# Patient Record
Sex: FEMALE | Race: BLACK OR AFRICAN AMERICAN | Employment: FULL TIME | URBAN - METROPOLITAN AREA
[De-identification: names, ages, dates, MRNs, and addresses within clinical notes are randomized per-mention and may not be internally consistent; named-entity substitution may affect disease eponyms.]

---

## 2021-03-31 LAB
HPV COMMENT: NORMAL
HPV TYPE 16: NOT DETECTED
HPV TYPE 18: NOT DETECTED
HPVOH (OTHER TYPES): NOT DETECTED

## 2021-08-10 ENCOUNTER — HOSPITAL ENCOUNTER (EMERGENCY)
Age: 51
Discharge: HOME | End: 2021-08-10
Attending: EMERGENCY MEDICINE
Payer: COMMERCIAL

## 2021-08-10 VITALS
RESPIRATION RATE: 16 BRPM | SYSTOLIC BLOOD PRESSURE: 140 MMHG | HEIGHT: 64 IN | DIASTOLIC BLOOD PRESSURE: 91 MMHG | TEMPERATURE: 97.2 F | HEART RATE: 67 BPM | BODY MASS INDEX: 35.68 KG/M2 | OXYGEN SATURATION: 98 % | WEIGHT: 209 LBS

## 2021-08-10 DIAGNOSIS — G89.29 CHRONIC PAIN OF LEFT WRIST: Primary | ICD-10-CM

## 2021-08-10 DIAGNOSIS — M25.532 CHRONIC PAIN OF LEFT WRIST: Primary | ICD-10-CM

## 2021-08-10 PROCEDURE — 99283 EMERGENCY DEPT VISIT LOW MDM: CPT

## 2021-08-10 RX ORDER — PROPRANOLOL HYDROCHLORIDE 80 MG/1
TABLET ORAL
COMMUNITY
Start: 2021-08-01

## 2021-08-10 RX ORDER — PANTOPRAZOLE SODIUM 40 MG/1
40 TABLET, DELAYED RELEASE ORAL
COMMUNITY
Start: 2021-05-24

## 2021-08-10 RX ORDER — ONDANSETRON 4 MG/1
4 TABLET, FILM COATED ORAL EVERY 8 HOURS PRN
COMMUNITY

## 2021-08-10 RX ORDER — FAMOTIDINE 20 MG/1
TABLET, FILM COATED ORAL
COMMUNITY
Start: 2021-05-16

## 2021-08-10 RX ORDER — METHYLPREDNISOLONE 4 MG/1
TABLET ORAL
COMMUNITY
Start: 2021-05-21

## 2021-08-10 RX ORDER — PREDNISONE 10 MG/1
TABLET ORAL
COMMUNITY
Start: 2021-06-04

## 2021-08-10 RX ORDER — POTASSIUM CHLORIDE 20 MEQ/1
TABLET, EXTENDED RELEASE ORAL
COMMUNITY
Start: 2021-05-17

## 2021-08-10 RX ORDER — MECLIZINE HYDROCHLORIDE 25 MG/1
25 TABLET ORAL 3 TIMES DAILY PRN
COMMUNITY

## 2021-08-10 RX ORDER — NORTRIPTYLINE HYDROCHLORIDE 10 MG/1
10 CAPSULE ORAL NIGHTLY
COMMUNITY

## 2021-08-10 RX ORDER — MONTELUKAST SODIUM 10 MG/1
TABLET ORAL
COMMUNITY
Start: 2021-06-14

## 2021-08-10 RX ORDER — FUROSEMIDE 20 MG/1
TABLET ORAL
COMMUNITY
Start: 2021-05-17

## 2021-08-10 RX ORDER — METHOCARBAMOL 500 MG/1
1000 TABLET, FILM COATED ORAL EVERY 8 HOURS PRN
COMMUNITY
Start: 2021-03-01

## 2021-08-10 RX ORDER — CETIRIZINE HYDROCHLORIDE 10 MG/1
TABLET ORAL
COMMUNITY
Start: 2021-07-06

## 2021-08-10 RX ORDER — BUTALBITAL, ACETAMINOPHEN AND CAFFEINE 50; 325; 40 MG/1; MG/1; MG/1
1 TABLET ORAL EVERY 4 HOURS PRN
COMMUNITY

## 2021-08-10 RX ORDER — LANSOPRAZOLE 15 MG/1
CAPSULE, DELAYED RELEASE ORAL
COMMUNITY
Start: 2021-05-13

## 2021-08-10 RX ORDER — AMLODIPINE BESYLATE 10 MG/1
10 TABLET ORAL DAILY
COMMUNITY

## 2021-08-10 RX ORDER — GABAPENTIN 100 MG/1
100 CAPSULE ORAL EVERY 8 HOURS PRN
Qty: 10 CAPSULE | Refills: 0 | Status: SHIPPED | OUTPATIENT
Start: 2021-08-10 | End: 2021-08-15

## 2021-08-10 ASSESSMENT — PAIN DESCRIPTION - PAIN TYPE: TYPE: ACUTE PAIN

## 2021-08-10 ASSESSMENT — PAIN SCALES - GENERAL: PAINLEVEL_OUTOF10: 10

## 2021-08-12 NOTE — ED PROVIDER NOTES
EMERGENCY DEPARTMENT PROVIDER NOTE    Patient Identification  Pt Name: Morenita Hernández  MRN: [id number]  Aryagframbo 1970  Date of evaluation: 8/10/2021  Provider: Yaritza Sanders DO  PCP: Peter Lyons    Chief Complaint  Arm Swelling (c/o left hand swelling. had surgery in august last year. went back to work last week and swelling has not improved. )      HPI  (History provided by patient)  This is a 54 y.o. female who was brought in by self for left hand and wrist pain. Patient reports pain ongoing for the past year after patient had tendon release for [employer]. Has had intermittent swelling to her left hand and wrist with seems to be worsened with activity, this is also been ongoing for the past year. Patient recently returned to work and is now again having swelling. Nothing seems to make her symptoms better. Pain is aching, mild in severity. She denies any direct trauma or injury. Denies any weakness or numbness.     ROS    Const:  No fevers, no chills  Skin:  No rash, no lesions  Card:  No chest pain, no palpitations, +edema  Resp:  No shortness of breath, no cough  Abd:  No abdominal pain, no nausea  MSK:  +joint pain, no myalgia  Neuro:  No focal weakness, no paresthesia    All other systems reviewed and negative unless otherwise noted in HPI      I have reviewed the following nursing documentation:  Allergies: Codeine    Past medical history:   Past Medical History:   Diagnosis Date    Osteoarthritis     both knees    Reflux      Past surgical history:   Past Surgical History:   Procedure Laterality Date    APPENDECTOMY      DILATION AND CURETTAGE OF UTERUS  8/19/11    HYSTEROSCOPY,NOVASURE ABLATION       Home medications:   Discharge Medication List as of 8/10/2021 11:06 PM      CONTINUE these medications which have NOT CHANGED    Details   propranolol (INDERAL) 80 MG tablet Historical Med      amLODIPine (NORVASC) 10 MG tablet Take 10 mg by mouth dailyHistorical Med cetirizine (ZYRTEC) 10 MG tablet TAKE 1 TABLET BY MOUTH EVERY DAYHistorical Med      butalbital-acetaminophen-caffeine (FIORICET, ESGIC) -40 MG per tablet Take 1 tablet by mouth every 4 hours as neededHistorical Med      furosemide (LASIX) 20 MG tablet TAKE 1 TABLET BY MOUTH EVERY DAYHistorical Med      famotidine (PEPCID) 20 MG tablet TAKE 1 TABLET (20 MG TOTAL) BY MOUTH 2 TIMES A DAY AS NEEDED FOR HEARTBURN FOR UP TO 5 DAYS. Historical Med      meclizine (ANTIVERT) 25 MG tablet Take 25 mg by mouth 3 times daily as neededHistorical Med      lansoprazole (PREVACID) 15 MG delayed release capsule Take by mouthHistorical Med      montelukast (SINGULAIR) 10 MG tablet TAKE 1 TABLET BY MOUTH EVERYDAY AT BEDTIMEHistorical Med      methylPREDNISolone (MEDROL DOSEPACK) 4 MG tablet TAKE 6 TABLETS ON DAY 1 AS DIRECTED ON PACKAGE AND DECREASE BY 1 TAB EACH DAY FOR A TOTAL OF 6 DAYSHistorical Med      methocarbamol (ROBAXIN) 500 MG tablet Take 1,000 mg by mouth every 8 hours as neededHistorical Med      nortriptyline (PAMELOR) 10 MG capsule Take 10 mg by mouth nightlyHistorical Med      ondansetron (ZOFRAN) 4 MG tablet Take 4 mg by mouth every 8 hours as neededHistorical Med      pantoprazole (PROTONIX) 40 MG tablet Take 40 mg by mouth 2 times daily (before meals)Historical Med      predniSONE (DELTASONE) 10 MG tablet PLEASE SEE ATTACHED FOR DETAILED DIRECTIONSHistorical Med      potassium chloride (KLOR-CON M) 20 MEQ extended release tablet Take by mouthHistorical Med      ibuprofen (ADVIL;MOTRIN) 800 MG tablet Take 800 mg by mouth every 6 hours as needed. Historical Med      omeprazole (PRILOSEC) 10 MG capsule Take 10 mg by mouth daily. Historical Med      ferrous sulfate 325 (65 FE) MG tablet Take 325 mg by mouth daily (with breakfast). Historical Med      Norethin Ace-Eth Estrad-FE (LOESTRIN 24 FE PO) Take 1 tablet by mouth daily. Historical Med             Social history:  reports that she has never smoked.  She has never used smokeless tobacco. She reports that she does not drink alcohol and does not use drugs. Family history:    Family History   Problem Relation Age of Onset    Diabetes Mother     COPD Mother     Arthritis Father     Heart Disease Father     Diabetes Father          Exam  ED Triage Vitals [08/10/21 2209]   BP Temp Temp Source Pulse Resp SpO2 Height Weight   (!) 140/91 97.2 °F (36.2 °C) Infrared 67 16 98 % 5' 4\" (1.626 m) 209 lb (94.8 kg)     Nursing note and vitals reviewed. Constitutional: Well developed, well nourished. Non-toxic in appearance. Neck: Supple. Trachea midline. Cardiovascular: RRR; no murmurs, rubs, or gallops. Radial 2+ and symmetric. Pulmonary/Chest: Effort normal. No respiratory distress. CTAB. No stridor. No wheezes. No rales. Musculoskeletal: Moves all extremities. No gross deformity. Mild tenderness palpation overlying lateral left wrist with minimal soft tissue edema along distal lateral forearm. Left wrist full range of motion in flexion and extension. There is no erythema or increased warmth. Neurological: Alert and oriented. Face symmetric. Speech is clear. 5 out of 5 motor and sensation grossly intact bilateral upper extremities. Skin: Warm and dry. No rash. Radiology  No orders to display       Labs  No results found for this visit on 08/10/21. Screenings           MDM and ED Course    Patient afebrile and nontoxic. No distress. Upper extremities are neurovascularly intact. Nothing to suggest limb ischemia, compartment syndrome or skin/soft tissue infection. No evidence of joint space infection. Patient symptoms are chronic, there has been no new injury, no anticipated benefit from imaging at this time. Patient states she is mainly seeking a second opinion from orthopedics. [place] safe for discharge to self-care with close PCP and orthopedic follow-up.   Will refer her to our orthopedic service here as per patient's request, however I also counseled her to follow-up with her regular orthopedist Dr. Shanice Marion. Patient is agreeable with plan and feels comfortable returning to home. Return precautions discussed. Final Impression  1. Chronic pain of left wrist        Blood pressure (!) 140/91, pulse 67, temperature 97.2 °F (36.2 °C), temperature source Infrared, resp. rate 16, height 5' 4\" (1.626 m), weight 209 lb (94.8 kg), SpO2 98 %. Disposition:  DISPOSITION Decision To Discharge 08/10/2021 10:51:14 PM      Patient Referrals:  Edith Plata MD  [address] [place] [address]  [id number]  [facility] [phone]    In 3 days      Tanesha Patrick MD  [address] [facility], [address] [place] [address],[address]  [address] [place] [address]  [phone]    In 1 week  Hand Surgery - for your second opinion      Discharge Medications:  Discharge Medication List as of 8/10/2021 11:06 PM      START taking these medications    Details   gabapentin (NEURONTIN) 100 MG capsule Take 1 capsule by mouth every 8 hours as needed (pain) for up to 5 days. Intended supply: 90 days, Disp-10 capsule, R-0Print             Discontinued Medications:  Discharge Medication List as of 8/10/2021 11:06 PM          This chart was generated using the [address] [place] [address] dictation system. I created this record but it may contain dictation errors given the limitations of this technology.     Vero Hall DO (electronically signed)  Attending Emergency Physician       Vero Hall DO  08/12/21 6857

## 2022-04-27 ENCOUNTER — OFFICE VISIT (OUTPATIENT)
Dept: ORTHOPEDIC SURGERY | Age: 52
End: 2022-04-27
Payer: COMMERCIAL

## 2022-04-27 VITALS — RESPIRATION RATE: 16 BRPM | WEIGHT: 209 LBS | HEIGHT: 64 IN | BODY MASS INDEX: 35.68 KG/M2

## 2022-04-27 DIAGNOSIS — R20.0 HAND NUMBNESS: Primary | ICD-10-CM

## 2022-04-27 PROCEDURE — 1036F TOBACCO NON-USER: CPT | Performed by: PHYSICIAN ASSISTANT

## 2022-04-27 PROCEDURE — 99203 OFFICE O/P NEW LOW 30 MIN: CPT | Performed by: PHYSICIAN ASSISTANT

## 2022-04-27 PROCEDURE — G8428 CUR MEDS NOT DOCUMENT: HCPCS | Performed by: PHYSICIAN ASSISTANT

## 2022-04-27 PROCEDURE — G8417 CALC BMI ABV UP PARAM F/U: HCPCS | Performed by: PHYSICIAN ASSISTANT

## 2022-04-27 PROCEDURE — L3908 WHO COCK-UP NONMOLDE PRE OTS: HCPCS | Performed by: PHYSICIAN ASSISTANT

## 2022-04-27 NOTE — PROGRESS NOTES
Ms. Vanita Oneill is a 54 y.o. right handed woman  who is seen today in Hand Surgical Consultation at the request of Warden Almaraz. She presents today regarding left wrist symptoms which have been present for approximately 2 years. A history of antecedent trauma or injury is present. She states that she did something at work when she was lifting. She reports symptoms to include moderate pain and swelling along the  left  Dorsal wrist and distal forearm. Wrist symptoms are worse with certain twisting or gripping activities. Symptoms are Daily worse with use. She reports no pain with thumb extension or pinch. Symptoms show no change over time. Previous treatment has included surgical treatment in the form of DeQuervains release by Dr. Katarzyna Jenkins in 08/2020. She states that Dr. Manda Ingram retired and her care was overtaken by Dr. Alley Kelly at [facility]. She reports continued pain and swelling since surgery. She reported that her  is involved because she has tried to get this covered under Worker Comp but was denied. She states that she has had injections and has been to therapy and has not seen any improvement. I asked her if she was her for a second opinion and she stated that she was referred from the Emergency Department. I have today reviewed with Vanita Oneill the clinically relevant, past medical history, medications, allergies,  family history, social history, and Review Of Systems & I have documented any details relevant to today's presenting complaints in my history above. Ms. Geni Dexter's self-reported past medical history, medications, allergies,  family history, social history, and Review Of Systems have been scanned into the chart under the \"Media\" tab. Physical Exam:  Ms. Raquel Bustamante most recent vitals:  Vitals  Resp: 16  Height: 5' 4\" (162.6 cm)  Weight: 209 lb (94.8 kg)    She is well nourished, oriented to person, place & time.   She demonstrates appropriate mood and affect as well as normal gait and station. Skin: Normal in appearance, Normal Color and Free of Lesions Bilaterally. Healed DeQuervains release incision noted over left wrist.   Digital range of motion is Full bilaterally. There is not triggering associated with active thumb motion. There is not a palpable mass overlying the site of maximal tenderness left radial wrist.  Wrist range of motion is without significant limitation and is accompanied by mild pain in dorsiflexion greater than palmar flexion. There is no evidence of gross joint instability bilaterally. Sensation is tingling in the Median Sensory distribution on the Left, greater than Right  Vascular examination reveals normal, good capillary refill and good color bilaterally   Swelling is absent along the radial margin of the wrist on the Left, normal on the Right. Mild swelling is present along the dorsum of the wrist. There is pain palpated along the EPL,next to Listers Tubercle. Muscular strength is clinically appropriate bilaterally. Examination of the left first dorsal extensor compartment of the wrist demonstrates no thickening and fullness. There is mild tenderness to palpation over the extensor tendons. There is no pain with resisted thumb extension, and Finklestein's maneuver is negative left side. Wrist range of motion is accompanied by moderate pain in dorsiflexion greater than palmar flexion. Examination for Carpal Tunnel Syndrome shows Carpal Tunnel Compression Test to be Mildly Positive on the right & Mildly Positive on the left. The patient displays mild baseline symptoms to potentially confound the exam.  The thenar musculature is not atrophied & weakened. Examination for Stenosing Tenosynovitis demonstrates no evidence of tenderness, thickening or nodularity at the A-1 pulleys of the digits bilaterally. There no palpable triggering or any finger or thumb.           Impression:  Ms. Ruthy Jay has developed wrist Tendonitis, possible Intersection syndrome, which is currently exacerbated, and presents requesting further treatment. Plan:    I have had a thorough discussion with Ms. Mary Del Castillo regarding the treatment options available for her initially presenting bilateral carpal tunnel syndrome, which is causing her significant symptoms and difficulty. I have outlined for Ms. Mary Del Castillo the risk, benefits and consequences of the various treatment modalities, including a reasonable expectation for the long term success of each. We have discussed the likelihood that further, more aggressive treatment may be required for her current presenting condition. Based upon our current discussion and a reasonable understating of the options available to her, Ms. Mary Del Castillo has selected to proceed with a conservative plan of treatment consisting of: the use of protective splints, activity modification, and the judicious use of over-the-counter anti-inflammatory medications if allowed by her primary care physician. An appropriately sized carpal tunnel brace was not needed  Instructions were given regarding splint use and wear as well as suggestions for use of the other modalities were discussed. I have clearly explained to her that the above outlined treatment plan should not be expected to 'cure' her carpal tunnel syndrome, but we are rather treating the symptoms with which she presents. She has understood that in order to achieve more durable relief of her symptoms and to prevent future worsening or further damage, that definitive surgical treatment would be required. Ms. Mary Del Castillo  voiced an appropriate understanding of our discussion, the options available to her, and of the expectations of her selected  treatment. I will ask Ms. Mary Del Castillo. to undergo electrodiagnostic studies of the symptomatic both sides.   I will ask that she schedule a follow-up appointment with me to review the results of this study after it has been completed. I have had a thorough discussion with Ms. Jan Villarreal regarding the treatment options available for her initially presenting left  Wrist Tendonitis, which is causing her significant symptoms and difficulty. I have outlined for Ms. Jan Villarreal the risk, benefits and consequences of the various treatment modalities, including a reasonable expectation for the long term success of each. We have discussed the likelihood that further, more aggressive treatment may be required for her current presenting condition. Based upon our current discussion and a reasonable understating of the options available to her, Ms. Jan Villarreal has selected to proceed with a conservative plan of treatment consisting of: the use of protective splints, activity modification, and the judicious use of over-the-counter anti-inflammatory medications if allowed by her primary care physician. An appropriately sized Removable forearm based Thumb-Spica orthosis was provided. Instructions were given regarding splint use and wear as well as suggestions for use of the other modalities were discussed. I have clearly explained to her that the above outlined treatment plan should not be expected to 'cure' her  DeQuervain's Tenosynovitis, but we are rather treating the symptoms with which she presents. She has understood that in order to achieve more durable relief of her symptoms and to prevent future worsening or further damage, that definitive surgical treatment would be required. Ms. Jan Villarreal  voiced an appropriate understanding of our discussion, the options available to her, and of the expectations of her selected  treatment. Procedures    Katherine Magana Titan Wrist and Thumb Brace     Patient was prescribed a Katherine Magana Titan Wrist and Thumb Brace.   The left wrist and thumb will require stabilization / immobilization from this semi-rigid / rigid orthosis to improve their function. The orthosis will assist in protecting the affected area, provide functional support and facilitate healing. The patient was educated and fit by a healthcare professional with expert knowledge and specialization in brace application while under the direct supervision of the treating physician. Verbal and written instructions for the use of and application of this item were provided. They were instructed to contact the office immediately should the brace result in increased pain, decreased sensation, increased swelling or worsening of the condition. I have also discussed with Ms. Gloria Marion  the other treatment options available to her  for this condition. We have today selected to proceed with conservative management. She and I have agreed that if our current course of conservative treatment does not prove to be effective over the short term future, that she will schedule a follow-up appointment to discuss and select an alternate course of therapy including possibly injection or surgical treatment. I have explained to Ms. Mary De Oliveira that if she is pursuing anything that involves prior treatment and trying to get injuries approved by Workers Comp that we will not be taking over care. She stated that she is using her own insurance and would like to proceed with treatment through [facility]. I explained to her that this is fine, but we would not address anything having to do with  since this is related to visits prior to her visit today. She understood our discussion and wished to proceed with her visit. I explained to her that I agree with Dr. Arielle Garcia  Based on her last visit with him on 04/01/22 that an EMG should be ordered and that she has tendonitis of the wrist.     Ms. Sol Teague mentioned multiple times throughout her visit that after her surgery by Dr. Pina Pemberton that she was sent back to work too soon.  I explained to her that if she were to have any type of surgical procedure that Dr. Dottie Paz would not take her off of work. She understood our discussion and again wished to proceed with getting an EMG. Ms. Jan Villarreal has been given a full verbal list of instructions and precautions related to her present condition. I have asked her to followup with me in the office at the prescribed time. She is also specifically requested to call or return to the office sooner if her symptoms change or worsen prior to the next scheduled appointment.

## 2022-05-24 ENCOUNTER — HOSPITAL ENCOUNTER (OUTPATIENT)
Dept: NEUROLOGY | Age: 52
Discharge: HOME | End: 2022-05-24
Payer: COMMERCIAL

## 2022-05-24 PROCEDURE — 95886 MUSC TEST DONE W/N TEST COMP: CPT

## 2022-05-24 PROCEDURE — 95910 NRV CNDJ TEST 7-8 STUDIES: CPT

## 2022-05-24 NOTE — PROCEDURES
Test Date:  2022    Patient: Jnenifer Polk : 1970 Physician: Aby Frias DO   Sex: Female ID#:  Ref Phys: Sonia Mcneil PA-C     Patient Complaints:  Patient is a 54year-old female who presents with numbness tingling in the hands onset after hand injury 2 yrs ago. Patient History / Exam:  PMH: no endocrine disease. + left hand surgery 2yrs for de Quervain. no other upper limb surgery PE: reflexes trace, + thumb opposition weakness     NCV & EMG Findings:  Evaluation of the left median (APB) motor and the right median (APB) motor nerves showed prolonged distal onset latency (L4.6, R4.2 ms). The left median sensory and the right median sensory nerves showed prolonged distal peak latency (L3.7, R4.6 ms) and decreased conduction velocity (L38, R30 m/s). All remaining nerves (as indicated in the following tables) were within normal limits. All examined muscles (as indicated in the following table) showed no evidence of electrical instability. Impression:  study is consistent with mild to moderate bilateral carpal tunnel syndrome. No evidence of an acute radiculopathy or other entrapment neuropathy.          Aby Frias DO        Nerve Conduction Studies  Motor Nerve Results      Latency Amplitude F-Lat Segment Distance CV Comment   Site (ms) Norm (mV) Norm (ms)  (cm) (m/s) Norm    Left Median (APB) Motor   Wrist 4.6  < 4.2 7.7  > 5.0         Elbow 8.2 - 2.6 -  Elbow-Wrist 22 61  > 50    Right Median (APB) Motor   Wrist 4.2  < 4.2 10.2  > 5.0         Elbow 8.0 - 3.9 -  Elbow-Wrist 22 58  > 50    Left Ulnar (ADM) Motor   Wrist 3.6  < 4.2 8.0  > 3.0         Bel Elbow 7.4 - 8.5 -  Bel Elbow-Wrist 28 74  > 50    Abv Elbow 8.3 - 4.3 -  Abv Elbow-Bel Elbow 6 67  > 48    Right Ulnar (ADM) Motor   Wrist 3.5  < 4.2 7.1  > 3.0         Bel Elbow 6.9 - 5.7 -  Bel Elbow-Wrist 22 65  > 50    Abv Elbow 7.5 - 6.1 -  Abv Elbow-Bel Elbow 4 67  > 48      Sensory Nerve Results      Latency (Peak) Amplitude (P-P) Segment Distance CV Comment   Site (ms) Norm (µV) Norm  (cm) (m/s) Norm    Left Median Sensory   Wrist-Dig II 3.7  < 3.6 42  > 10 Wrist-Dig II 14 38  > 39    Right Median Sensory   Wrist-Dig II 4.6  < 3.6 74  > 10 Wrist-Dig II 14 30  > 39    Left Ulnar Sensory   Wrist-Dig V 3.5  < 3.7 27  > 15 Wrist-Dig V 14 40  > 38    Right Ulnar Sensory   Wrist-Dig V 3.5  < 3.7 63  > 15 Wrist-Dig V 14 40  > 38        Electromyography     Side Muscle Nerve Root Ins Act Fibs Psw Amp Dur Poly Recrt Int Saralyn B[place] Comment   Left Deltoid Axillary C5-C6 Nml Nml Nml Nml Nml 0 Nml Nml    Left Biceps Musculocut C5-C6 Nml Nml Nml Nml Nml 0 Nml Nml    Left Triceps Radial C6-C8 Nml Nml Nml Nml Nml 0 Nml Nml    Left Brachiorad Radial C5-C6 Nml Nml Nml Nml Nml 0 Nml Nml    Left Pronator Teres Median C6-C7 Nml Nml Nml Nml Nml 0 Nml Nml    Left EIP Post Interosseous,  R... C7-C8 Nml Nml Nml Nml Nml 0 Nml Nml    Left APB Median C8-T1 Nml Nml Nml Nml Nml 0 Nml Nml    Left FDI Ulnar C8-T1 Nml Nml Nml Nml Nml 0 Nml Nml    Left Cervical Paraspinal (Uppe. .. Rami C1-C3 Nml Nml Nml         Left Cervical Paraspinal (Mid) Rami C4-C6 Nml Nml Nml         Left Cervical Paraspinal (Jose Her. .. Rami C7-C8 Nml Nml Nml         Right Deltoid Axillary C5-C6 Nml Nml Nml Nml Nml 0 Nml Nml    Right Biceps Musculocut C5-C6 Nml Nml Nml Nml Nml 0 Nml Nml    Right Triceps Radial C6-C8 Nml Nml Nml Nml Nml 0 Nml Nml    Right Brachiorad Radial C5-C6 Nml Nml Nml Nml Nml 0 Nml Nml    Right Pronator Teres Median C6-C7 Nml Nml Nml Nml Nml 0 Nml Nml    Right EIP Post Interosseous,  R... C7-C8 Nml Nml Nml Nml Nml 0 Nml Nml    Right APB Median C8-T1 Nml Nml Nml Nml Nml 0 Nml Nml    Right FDI Ulnar C8-T1 Nml Nml Nml Nml Nml 0 Nml Nml    Right Cervical Paraspinal (Uppe. .. Rami C1-C3 Nml Nml Nml         Right Cervical Paraspinal (Mid) Rami C4-C6 Nml Nml Nml         Right Cervical Paraspinal (Jose Her. ..  Rami C7-C8 Nml Nml Nml             Electronically signed by Rocco Mahoney, DO on 5/24/2022 at 1:34 PM

## 2022-06-22 ENCOUNTER — OFFICE VISIT (OUTPATIENT)
Dept: ORTHOPEDIC SURGERY | Age: 52
End: 2022-06-22
Payer: COMMERCIAL

## 2022-06-22 VITALS — WEIGHT: 209 LBS | RESPIRATION RATE: 16 BRPM | BODY MASS INDEX: 35.68 KG/M2 | HEIGHT: 64 IN

## 2022-06-22 DIAGNOSIS — R20.0 HAND NUMBNESS: Primary | ICD-10-CM

## 2022-06-22 PROCEDURE — 99214 OFFICE O/P EST MOD 30 MIN: CPT | Performed by: ORTHOPAEDIC SURGERY

## 2022-06-22 PROCEDURE — G8417 CALC BMI ABV UP PARAM F/U: HCPCS | Performed by: ORTHOPAEDIC SURGERY

## 2022-06-22 PROCEDURE — G8427 DOCREV CUR MEDS BY ELIG CLIN: HCPCS | Performed by: ORTHOPAEDIC SURGERY

## 2022-06-22 PROCEDURE — 1036F TOBACCO NON-USER: CPT | Performed by: ORTHOPAEDIC SURGERY

## 2022-06-22 NOTE — PATIENT INSTRUCTIONS
Thank you for choosing [facility]) Physicians for your Hand and Upper Extremity needs. If we can be of any further assistance to you, please do not hesitate to contact us.     Office Phone Number:  [phone]  or  [phone]

## 2022-06-22 NOTE — PROGRESS NOTES
Compartment tendon sheath and along the dorsal wrist and hand on the Left, normal on the Right  Muscular strength is clinically appropriate bilaterally. Examination of the left dorsal   wrist demonstrates no thickening and fullness, diffuse nonspecific pain to palpation  There is  No focal clinical abnormality There is no pain with resisted thumb extension  left side. Examination for Carpal Tunnel Syndrome shows Carpal Tunnel Compression Test to be negative bilaterally. Phalen's Maneuver is negative bilaterally. The thenar musculature shows no evidence of atrophy or weakness. Examination of the first Carpo-Metacarpal Joints of the wrist demonstrates no radial subluxation of the Thumb Metacarpal base upon the Trapezium. There is no significant pain with palpation or crepitance at the [facility] joint line. Review of Electrodiagnostic Testing:  Electrodiagnostic Studies performed by another Physician outside of my practice were Personally Reviewed & Interpreted by myself today. Test performed on: 5/24/22    NERVE CONDUCTION STUDY:  RIGHT   Median Nerve: Sensory Latency: 4.6  Motor Latency: 4.2  Ulnar Nerve:  Conduction Velocity:  67    LEFT  Median Nerve: Sensory Latency: 3.7  Motor Latency: 4.6  Ulnar Nerve:  Conduction Velocity:  67    EMG:  RIGHT  Normal    LEFT  Normal    My Interpretation: This study is consistent with: Mild RIGHT Median Nerve Entrapment at the Carpal Tunnel and Mild LEFT Median Nerve Entrapment at the Carpal Tunnel        Impression:  Ms. Kusum Lopez is showing evidence of significant persistent and chronic symptoms without focal clinical findings or etiology to explain or correlate with these symptoms.  She does NOT clinically demonstrate carpal tunnel syndrome but has very mild findings on recent  Electrodiagnostic Studies      Plan:  I have had a thorough discussion with Ms. Kusum Lopez regarding the treatment options available for her very mild and likely asymptomatic bilateral carpal tunnel syndrome,, with a constellation of other symptoms which are likely unrelated,   .  I have outlined for Ms. Alissa Salgado the risk, benefits and consequences of the various treatment modalities, including a reasonable expectation for the long term success of each. We have discussed the likelihood that further, more aggressive treatment is not likely necessary for her current presenting condition. Based upon our current discussion and a reasonable understating of the options available to her, Ms. Alissa Salgado has selected to proceed with a conservative plan of treatment consisting of: the use of protective splints, activity modification, and the judicious use of over-the-counter anti-inflammatory medications if allowed by her primary care physician. An appropriately sized Removable Carpal Tunnel Splint has been previously provided. Instructions were given regarding splint use and wear as well as suggestions for use of the other modalities were discussed. I have clearly explained to her that the above outlined treatment plan should not be expected to 'cure' her carpal tunnel syndrome, but we are rather treating the symptoms with which she presents. Ms. Alissa Salgado  voiced an appropriate understanding of our discussion, the options available to her, and of the expectations of her selected  treatment. There is no indication for more aggressive intervention such as injection or surgical treatment of her  condition at this time. I have asked Ms. Alissa Salgado  to feel free to contact me or schedule a follow-up appointment at any time that she feels the need for any further evaluation or treatment for her upper extremitiy condition. If she feels that she continues to be feeling and functioning well, she may choose not to seek any further follow-up or treatment at her discretion. I will remain available to continue her care at any time in the future.

## 2022-06-22 NOTE — Clinical Note
Dear  Carmine Yeung,    Thank you very much for your referral or Ms. Gloria Marion to me for evaluation and treatment of her Hand & Wrist condition. I appreciate your confidence in me and thank you for allowing me the opportunity to care for your patients. If I can be of any further assistance to you on this or any other patient, please do not hesitate to contact me. Sincerely,    Nubia Lynch.  Nathaly Slaughter MD